# Patient Record
Sex: MALE | Race: WHITE | NOT HISPANIC OR LATINO | Employment: FULL TIME | URBAN - METROPOLITAN AREA
[De-identification: names, ages, dates, MRNs, and addresses within clinical notes are randomized per-mention and may not be internally consistent; named-entity substitution may affect disease eponyms.]

---

## 2018-10-02 ENCOUNTER — OFFICE VISIT (OUTPATIENT)
Dept: URGENT CARE | Facility: CLINIC | Age: 58
End: 2018-10-02
Payer: COMMERCIAL

## 2018-10-02 VITALS
RESPIRATION RATE: 18 BRPM | WEIGHT: 230 LBS | DIASTOLIC BLOOD PRESSURE: 84 MMHG | SYSTOLIC BLOOD PRESSURE: 142 MMHG | HEIGHT: 71 IN | HEART RATE: 70 BPM | OXYGEN SATURATION: 97 % | TEMPERATURE: 98 F | BODY MASS INDEX: 32.2 KG/M2

## 2018-10-02 DIAGNOSIS — S60.112A SUBUNGUAL HEMATOMA OF LEFT THUMB, INITIAL ENCOUNTER: ICD-10-CM

## 2018-10-02 DIAGNOSIS — S67.02XA CRUSH INJURY TO THUMB, LEFT, INITIAL ENCOUNTER: Primary | ICD-10-CM

## 2018-10-02 PROCEDURE — 99203 OFFICE O/P NEW LOW 30 MIN: CPT | Mod: S$GLB,,, | Performed by: NURSE PRACTITIONER

## 2018-10-02 PROCEDURE — 73140 X-RAY EXAM OF FINGER(S): CPT | Mod: FY,LT,S$GLB, | Performed by: RADIOLOGY

## 2018-10-02 RX ORDER — PRAMIPEXOLE DIHYDROCHLORIDE 1 MG/1
TABLET ORAL
Refills: 3 | COMMUNITY
Start: 2018-06-27

## 2018-10-02 NOTE — PATIENT INSTRUCTIONS
Follow up with your doctor in a few days.  Return to the urgent care or go to the ER if symptoms get worse.    Soak thumb in warm epsom salt soaks or warm compress 1-2 times a day.  Ibuprofen and/or tylenol for pain.    Rest the affected joint or limb, ice for no longer than 20 minutes at a time, compress, & elevate for 24 -48 hours.      Follow up with the orthopedist in 1 week if you continue with pain or if directed to do so.  Sometimes it can take up to 1 week for stress fractures to show up on an X-ray, and may need reimaging or a CT or MRI of the affected area.          Subungual Hematoma    You just slammed the car door on your finger. The pain is nearly unbearable, and your nail has turned black and blue. It's likely you have a subungual hematoma. This is a pool of blood that collects under a nail after an injury. Although a nail hematoma is seldom serious, it can be very painful.  When to go to the emergency room (ER)  Any severe blow to a finger or toe should be checked by your health care provider. You may have broken bones or damage to other tissues. If you can't see your health care provider right away, go to the nearest emergency department.  What to expect in the ER  · Your nail will be examined.  · X-rays may be taken to check for a bone fracture or other injury.  · To drain the blood from the hematoma and relieve pain, the health care provider may make a small hole in the nail using a special jarod or drill. The blood under the nail can drain out through this hole. The nail is then bandaged.  · If the nail is badly damaged it may need to be removed. Deep cuts beneath the nail can then be repaired with stitches.  Follow-up  If the damaged nail isn't removed, it will most likely fall off on its own. A fingernail can regrow in as little as 8 weeks. Toenails take longer -- about 6 months. See your health care provider if you have any problems with the nail as it heals and regrows.  Date Last Reviewed:  5/5/2015  © 7543-7640 [employer]. [address] [place] [address], [place] [address]. All rights reserved. This information is not intended as a substitute for professional medical care. Always follow your healthcare professional's instructions.        Finger Contusion  You have a contusion. This is also called a bruise. There is swelling and some bleeding under the skin, but no broken bones. This injury generally takes a few days to a few weeks to heal. During that time, the bruise will typically change in color from reddish, to purple-blue, to greenish-yellow, then to yellow-brown.  A finger contusion may be treated with a splint or mynor tape (taping the injured finger to the one next to it for support). Minor contusions likely will need no other treatment.  Home care  · Elevate the hand to reduce pain and swelling. As much as possible, sit or lie down with the hand raised about the level of your heart. This is especially important during the first 48 hours.  · Ice the finger to help reduce pain and swelling. Wrap a cold source (ice pack or ice cubes in a plastic bag) in a thin towel. Apply to the bruised finger for 20 minutes every 1 to 2 hours the first day. Continue this 3 to 4 times a day until the pain and swelling goes away.  · If mynor tape was applied and it becomes wet or dirty, change it. You may replace it with paper, plastic, or cloth tape. Before taping, put a thin strip of cotton or gauze between the fingers to absorb sweat.  · Unless another medication was prescribed, you can take acetaminophen, ibuprofen, or naproxen to control pain. (If you have chronic liver or kidney disease or ever had a stomach ulcer or GI bleeding, talk with your doctor before using these medicines.)  Follow up  Follow up with your healthcare provider or our staff as advised. Call if you are not improving within 1 to 2 weeks.  When to seek medical advice   Call your healthcare provider right away if you have any of the  following:  · Increased pain or swelling  · Hand or arm becomes cold, blue, numb or tingly  · Signs of infection: Warmth, drainage, or increased redness or pain around the bruise  · Inability to move the injured finger or hand   · Frequent bruising for unknown reasons  Date Last Reviewed: 4/29/2015  © 2299-0463 [employer]. [address] [place], [place] [id number]. All rights reserved. This information is not intended as a substitute for professional medical care. Always follow your healthcare professional's instructions.

## 2018-10-02 NOTE — PROGRESS NOTES
"Subjective:       Patient ID: Ronnell Bernal is a 58 y.o. male.    Vitals:  height is 5' 11" (1.803 m) and weight is 104.3 kg (230 lb). His oral temperature is 98.3 °F (36.8 °C). His blood pressure is 142/84 (abnormal) and his pulse is 70. His respiration is 18 and oxygen saturation is 97%.     Chief Complaint: Finger Injury    Patient to clinic complaining of left thumb injury. He states he smashed him thumb with a hammer 2 days ago. Denies any blood loss. He states the pain is worse when he try to move his thumb or apply pressure to it.      Review of Systems   Constitution: Negative for weakness and malaise/fatigue.   HENT: Negative for nosebleeds.    Cardiovascular: Negative for chest pain and syncope.   Respiratory: Negative for shortness of breath.    Musculoskeletal: Positive for joint pain and joint swelling. Negative for back pain and neck pain.   Gastrointestinal: Negative for abdominal pain.   Genitourinary: Negative for hematuria.   Neurological: Negative for dizziness and numbness.       Objective:      Physical Exam   Constitutional: He is oriented to person, place, and time. He appears well-developed and well-nourished. He is cooperative.  Non-toxic appearance. He does not appear ill. No distress.   HENT:   Head: Normocephalic and atraumatic. Head is without abrasion, without contusion and without laceration.   Right Ear: Hearing, tympanic membrane, external ear and ear canal normal. No hemotympanum.   Left Ear: Hearing, tympanic membrane, external ear and ear canal normal. No hemotympanum.   Nose: Nose normal. No mucosal edema, rhinorrhea or nasal deformity. No epistaxis. Right sinus exhibits no maxillary sinus tenderness and no frontal sinus tenderness. Left sinus exhibits no maxillary sinus tenderness and no frontal sinus tenderness.   Mouth/Throat: Uvula is midline, oropharynx is clear and moist and mucous membranes are normal. No trismus in the jaw. Normal dentition. No uvula swelling. No " posterior oropharyngeal erythema.   Eyes: Conjunctivae, EOM and lids are normal. Pupils are equal, round, and reactive to light. Right eye exhibits no discharge. Left eye exhibits no discharge. No scleral icterus.   Sclera clear bilat   Neck: Trachea normal, normal range of motion, full passive range of motion without pain and phonation normal. Neck supple. No spinous process tenderness and no muscular tenderness present. No neck rigidity. No tracheal deviation present.   Cardiovascular: Normal rate, regular rhythm, normal heart sounds, intact distal pulses and normal pulses.   Pulmonary/Chest: Effort normal and breath sounds normal. No respiratory distress.   Abdominal: Soft. Normal appearance and bowel sounds are normal. He exhibits no distension, no pulsatile midline mass and no mass. There is no tenderness.   Musculoskeletal: He exhibits no edema or deformity.        Left hand: He exhibits decreased range of motion and tenderness. Normal sensation noted. Decreased strength noted.        Hands:  TTP over left thumb dorsal surface from DIP to tip. Partial flexion of DIP joint.  subungual hematoma noted with blanching around proximal nailbed and erythema to tip of thumb.   Neurological: He is alert and oriented to person, place, and time. He has normal strength. No cranial nerve deficit or sensory deficit. He exhibits normal muscle tone. He displays no seizure activity. Coordination normal. GCS eye subscore is 4. GCS verbal subscore is 5. GCS motor subscore is 6.   Skin: Skin is warm, dry and intact. Capillary refill takes less than 2 seconds. No abrasion, no bruising, no burn, no ecchymosis and no laceration noted. He is not diaphoretic. No pallor.   Psychiatric: He has a normal mood and affect. His speech is normal and behavior is normal. Judgment and thought content normal. Cognition and memory are normal.   Nursing note and vitals reviewed.    X-ray Finger 2 Or More Views Left    Result Date:  10/2/2018  EXAMINATION: XR FINGER 2 OR MORE VIEWS LEFT CLINICAL HISTORY: crush injury to thumb;  Crushing injury of left thumb, initial encounter FINDINGS: No fracture dislocation bone destruction seen.     See above Electronically signed by: Darren Douglas MD Date:    10/02/2018 Time:    13:12  Left thumb cleaned/prepped with betadine,  subungual hematoma drained with 18g sterile needle. approx 1-2cc bloody drainage expressed. Patient reported decrease in pressure/pain immediately. Area with bactroban applied and covered with dressing.     Assessment:       1. Crush injury to thumb, left, initial encounter    2. Subungual hematoma of left thumb, initial encounter        Plan:     advised patient on splinting finger if uncomfortable. Patient declined at this time.    Crush injury to thumb, left, initial encounter  -     X-Ray Finger 2 or More Views Left; Future; Expected date: 10/02/2018    Subungual hematoma of left thumb, initial encounter      Patient Instructions   Follow up with your doctor in a few days.  Return to the urgent care or go to the ER if symptoms get worse.    Soak thumb in warm epsom salt soaks or warm compress 1-2 times a day.  Ibuprofen and/or tylenol for pain.    Rest the affected joint or limb, ice for no longer than 20 minutes at a time, compress, & elevate for 24 -48 hours.      Follow up with the orthopedist in 1 week if you continue with pain or if directed to do so.  Sometimes it can take up to 1 week for stress fractures to show up on an X-ray, and may need reimaging or a CT or MRI of the affected area.          Subungual Hematoma    You just slammed the car door on your finger. The pain is nearly unbearable, and your nail has turned black and blue. It's likely you have a subungual hematoma. This is a pool of blood that collects under a nail after an injury. Although a nail hematoma is seldom serious, it can be very painful.  When to go to the emergency room (ER)  Any severe blow to a  finger or toe should be checked by your health care provider. You may have broken bones or damage to other tissues. If you can't see your health care provider right away, go to the nearest emergency department.  What to expect in the ER  · Your nail will be examined.  · X-rays may be taken to check for a bone fracture or other injury.  · To drain the blood from the hematoma and relieve pain, the health care provider may make a small hole in the nail using a special jarod or drill. The blood under the nail can drain out through this hole. The nail is then bandaged.  · If the nail is badly damaged it may need to be removed. Deep cuts beneath the nail can then be repaired with stitches.  Follow-up  If the damaged nail isn't removed, it will most likely fall off on its own. A fingernail can regrow in as little as 8 weeks. Toenails take longer -- about 6 months. See your health care provider if you have any problems with the nail as it heals and regrows.  Date Last Reviewed: 5/5/2015  © 9850-8266 [employer]. [address] [place] [address]. All rights reserved. This information is not intended as a substitute for professional medical care. Always follow your healthcare professional's instructions.        Finger Contusion  You have a contusion. This is also called a bruise. There is swelling and some bleeding under the skin, but no broken bones. This injury generally takes a few days to a few weeks to heal. During that time, the bruise will typically change in color from reddish, to purple-blue, to greenish-yellow, then to yellow-brown.  A finger contusion may be treated with a splint or mynor tape (taping the injured finger to the one next to it for support). Minor contusions likely will need no other treatment.  Home care  · Elevate the hand to reduce pain and swelling. As much as possible, sit or lie down with the hand raised about the level of your heart. This is especially important during  the first 48 hours.  · Ice the finger to help reduce pain and swelling. Wrap a cold source (ice pack or ice cubes in a plastic bag) in a thin towel. Apply to the bruised finger for 20 minutes every 1 to 2 hours the first day. Continue this 3 to 4 times a day until the pain and swelling goes away.  · If mynor tape was applied and it becomes wet or dirty, change it. You may replace it with paper, plastic, or cloth tape. Before taping, put a thin strip of cotton or gauze between the fingers to absorb sweat.  · Unless another medication was prescribed, you can take acetaminophen, ibuprofen, or naproxen to control pain. (If you have chronic liver or kidney disease or ever had a stomach ulcer or GI bleeding, talk with your doctor before using these medicines.)  Follow up  Follow up with your healthcare provider or our staff as advised. Call if you are not improving within 1 to 2 weeks.  When to seek medical advice   Call your healthcare provider right away if you have any of the following:  · Increased pain or swelling  · Hand or arm becomes cold, blue, numb or tingly  · Signs of infection: Warmth, drainage, or increased redness or pain around the bruise  · Inability to move the injured finger or hand   · Frequent bruising for unknown reasons  Date Last Reviewed: 4/29/2015  © 6868-3058 [employer]. [address] [place] [address], [place] [address]. All rights reserved. This information is not intended as a substitute for professional medical care. Always follow your healthcare professional's instructions.